# Patient Record
Sex: FEMALE | Race: WHITE | NOT HISPANIC OR LATINO | URBAN - METROPOLITAN AREA
[De-identification: names, ages, dates, MRNs, and addresses within clinical notes are randomized per-mention and may not be internally consistent; named-entity substitution may affect disease eponyms.]

---

## 2023-03-07 ENCOUNTER — APPOINTMENT (RX ONLY)
Dept: URBAN - METROPOLITAN AREA CLINIC 66 | Facility: CLINIC | Age: 28
Setting detail: DERMATOLOGY
End: 2023-03-07

## 2023-03-07 DIAGNOSIS — R21 RASH AND OTHER NONSPECIFIC SKIN ERUPTION: ICD-10-CM | Status: INADEQUATELY CONTROLLED

## 2023-03-07 PROCEDURE — ? MEDICATION COUNSELING

## 2023-03-07 PROCEDURE — ? COUNSELING

## 2023-03-07 PROCEDURE — ? PRESCRIPTION MEDICATION MANAGEMENT

## 2023-03-07 PROCEDURE — 99204 OFFICE O/P NEW MOD 45 MIN: CPT

## 2023-03-07 PROCEDURE — ? FULL BODY SKIN EXAM - DECLINED

## 2023-03-07 PROCEDURE — ? PRESCRIPTION

## 2023-03-07 RX ORDER — PIMECROLIMUS 10 MG/G
CREAM TOPICAL BID
Qty: 30 | Refills: 3 | Status: ERX | COMMUNITY
Start: 2023-03-07

## 2023-03-07 RX ORDER — MOMETASONE FUROATE 1 MG/G
CREAM TOPICAL BID
Qty: 45 | Refills: 1 | Status: ERX | COMMUNITY
Start: 2023-03-07

## 2023-03-07 RX ADMIN — MOMETASONE FUROATE: 1 CREAM TOPICAL at 00:00

## 2023-03-07 RX ADMIN — PIMECROLIMUS: 10 CREAM TOPICAL at 00:00

## 2023-03-07 ASSESSMENT — LOCATION DETAILED DESCRIPTION DERM
LOCATION DETAILED: LEFT SUPERIOR CENTRAL MALAR CHEEK
LOCATION DETAILED: RIGHT CENTRAL MALAR CHEEK
LOCATION DETAILED: LEFT CENTRAL BUCCAL CHEEK
LOCATION DETAILED: RIGHT CENTRAL BUCCAL CHEEK
LOCATION DETAILED: LEFT INFERIOR MEDIAL MALAR CHEEK
LOCATION DETAILED: RIGHT INFERIOR MEDIAL MALAR CHEEK

## 2023-03-07 ASSESSMENT — LOCATION ZONE DERM: LOCATION ZONE: FACE

## 2023-03-07 ASSESSMENT — LOCATION SIMPLE DESCRIPTION DERM
LOCATION SIMPLE: RIGHT CHEEK
LOCATION SIMPLE: LEFT CHEEK

## 2023-03-07 NOTE — PROCEDURE: PRESCRIPTION MEDICATION MANAGEMENT
Render In Strict Bullet Format?: No
Initiate Treatment: Mometasone 0.1% topical cream. Elidel 1% topical cream.
Detail Level: Zone

## 2023-03-07 NOTE — PROCEDURE: MEDICATION COUNSELING
Writer spoke with Anita with [facility], she wanted to update us as they recertified patient today. She will be seeing patient monthly for catheter changes and PRN for acute concerns.     FYI to PCP    Mirvaso Pregnancy And Lactation Text: This medication has not been assigned a Pregnancy Risk Category. It is unknown if the medication is excreted in breast milk.

## 2023-03-07 NOTE — PROCEDURE: MIPS QUALITY
Quality 431: Preventive Care And Screening: Unhealthy Alcohol Use - Screening: Patient not identified as an unhealthy alcohol user when screened for unhealthy alcohol use using a systematic screening method
Quality 226: Preventive Care And Screening: Tobacco Use: Screening And Cessation Intervention: Tobacco Screening not Performed
Detail Level: Detailed
Quality 130: Documentation Of Current Medications In The Medical Record: Current Medications Documented
Quality 402: Tobacco Use And Help With Quitting Among Adolescents: Patient screened for tobacco and never smoked

## 2023-03-28 ENCOUNTER — APPOINTMENT (RX ONLY)
Dept: URBAN - METROPOLITAN AREA CLINIC 66 | Facility: CLINIC | Age: 28
Setting detail: DERMATOLOGY
End: 2023-03-28

## 2023-03-28 DIAGNOSIS — R21 RASH AND OTHER NONSPECIFIC SKIN ERUPTION: ICD-10-CM | Status: INADEQUATELY CONTROLLED

## 2023-03-28 PROCEDURE — 99214 OFFICE O/P EST MOD 30 MIN: CPT

## 2023-03-28 PROCEDURE — ? ADDITIONAL NOTES

## 2023-03-28 PROCEDURE — ? MEDICATION COUNSELING

## 2023-03-28 PROCEDURE — ? PRESCRIPTION MEDICATION MANAGEMENT

## 2023-03-28 PROCEDURE — ? COUNSELING

## 2023-03-28 PROCEDURE — ? FULL BODY SKIN EXAM - DECLINED

## 2023-03-28 PROCEDURE — ? PRESCRIPTION

## 2023-03-28 RX ORDER — TACROLIMUS 1 MG/G
OINTMENT TOPICAL
Qty: 30 | Refills: 4 | Status: ERX | COMMUNITY
Start: 2023-03-28

## 2023-03-28 RX ADMIN — TACROLIMUS: 1 OINTMENT TOPICAL at 00:00

## 2023-03-28 ASSESSMENT — LOCATION DETAILED DESCRIPTION DERM
LOCATION DETAILED: LEFT INFERIOR CENTRAL MALAR CHEEK
LOCATION DETAILED: RIGHT SUPERIOR MEDIAL BUCCAL CHEEK
LOCATION DETAILED: RIGHT SUPERIOR CENTRAL MALAR CHEEK
LOCATION DETAILED: LEFT SUPERIOR MEDIAL MALAR CHEEK

## 2023-03-28 ASSESSMENT — LOCATION SIMPLE DESCRIPTION DERM
LOCATION SIMPLE: LEFT CHEEK
LOCATION SIMPLE: RIGHT CHEEK

## 2023-03-28 ASSESSMENT — LOCATION ZONE DERM: LOCATION ZONE: FACE

## 2023-03-28 NOTE — PROCEDURE: MIPS QUALITY
Detail Level: Detailed
Quality 130: Documentation Of Current Medications In The Medical Record: Current Medications Documented
Quality 402: Tobacco Use And Help With Quitting Among Adolescents: Patient screened for tobacco and never smoked
Quality 431: Preventive Care And Screening: Unhealthy Alcohol Use - Screening: Patient not identified as an unhealthy alcohol user when screened for unhealthy alcohol use using a systematic screening method
Quality 226: Preventive Care And Screening: Tobacco Use: Screening And Cessation Intervention: Tobacco Screening not Performed

## 2023-03-28 NOTE — PROCEDURE: PRESCRIPTION MEDICATION MANAGEMENT
Render In Strict Bullet Format?: No
Initiate Treatment: Tacrolimus 0.1% topical ointment
Detail Level: Zone
Continue Regimen: Mometasone 0.1% topical cream PRN when flaring
Discontinue Regimen: Elidel 1% topical cream

## 2023-03-28 NOTE — PROCEDURE: ADDITIONAL NOTES
Render Risk Assessment In Note?: no
Additional Notes: Patient was advised to take daily antihistamine
Detail Level: Simple

## 2023-03-28 NOTE — PROCEDURE: MEDICATION COUNSELING
minimum assist (75% patients effort)/contact guard Clofazimine Pregnancy And Lactation Text: This medication is Pregnancy Category C and isn't considered safe during pregnancy. It is excreted in breast milk.

## 2023-03-28 NOTE — PROCEDURE: MEDICATION COUNSELING
COVID-19 Screening:    • Does the patient OR patient’s household members have any of the following symptoms?  o Temperature: Fever ?100.0°F or ?37.8°C?  No  o Respiratory symptoms: New or worsening cough, shortness of breath, or sore throat? No  o GI symptoms: New onset of nausea, vomiting or diarrhea?  No  o Miscellaneous: New onset of loss of taste or smell, chills, repeated shaking with chills, muscle pain or headache?  No  • Has the patient or a household member tested positive for COVID-19 in the last 14 days?  No  • Has the patient or a household member been tested for COVID-19 and are waiting for the results?  No   Colchicine Counseling:  Patient counseled regarding adverse effects including but not limited to stomach upset (nausea, vomiting, stomach pain, or diarrhea).  Patient instructed to limit alcohol consumption while taking this medication.  Colchicine may reduce blood counts especially with prolonged use.  The patient understands that monitoring of kidney function and blood counts may be required, especially at baseline. The patient verbalized understanding of the proper use and possible adverse effects of colchicine.  All of the patient's questions and concerns were addressed.

## 2023-05-09 ENCOUNTER — APPOINTMENT (RX ONLY)
Dept: URBAN - METROPOLITAN AREA CLINIC 66 | Facility: CLINIC | Age: 28
Setting detail: DERMATOLOGY
End: 2023-05-09

## 2023-05-09 DIAGNOSIS — R21 RASH AND OTHER NONSPECIFIC SKIN ERUPTION: ICD-10-CM | Status: INADEQUATELY CONTROLLED

## 2023-05-09 PROCEDURE — ? ORDER TESTS

## 2023-05-09 PROCEDURE — ? PRESCRIPTION MEDICATION MANAGEMENT

## 2023-05-09 PROCEDURE — ? FULL BODY SKIN EXAM - DECLINED

## 2023-05-09 PROCEDURE — ? COUNSELING

## 2023-05-09 PROCEDURE — 99214 OFFICE O/P EST MOD 30 MIN: CPT

## 2023-05-09 PROCEDURE — ? PRESCRIPTION

## 2023-05-09 PROCEDURE — ? MEDICATION COUNSELING

## 2023-05-09 RX ORDER — DOXYCYCLINE HYCLATE 100 MG/1
TABLET, COATED ORAL BID
Qty: 60 | Refills: 0 | Status: ERX | COMMUNITY
Start: 2023-05-09

## 2023-05-09 RX ADMIN — DOXYCYCLINE HYCLATE: 100 TABLET, COATED ORAL at 00:00

## 2023-05-09 ASSESSMENT — LOCATION ZONE DERM: LOCATION ZONE: FACE

## 2023-05-09 ASSESSMENT — LOCATION SIMPLE DESCRIPTION DERM
LOCATION SIMPLE: LEFT CHEEK
LOCATION SIMPLE: RIGHT CHEEK
LOCATION SIMPLE: CHIN
LOCATION SIMPLE: INFERIOR FOREHEAD

## 2023-05-09 ASSESSMENT — LOCATION DETAILED DESCRIPTION DERM
LOCATION DETAILED: RIGHT CENTRAL MALAR CHEEK
LOCATION DETAILED: LEFT CHIN
LOCATION DETAILED: LEFT CENTRAL MALAR CHEEK
LOCATION DETAILED: INFERIOR MID FOREHEAD

## 2023-05-09 NOTE — PROCEDURE: MIPS QUALITY
Quality 130: Documentation Of Current Medications In The Medical Record: Current Medications Documented
Quality 431: Preventive Care And Screening: Unhealthy Alcohol Use - Screening: Patient not identified as an unhealthy alcohol user when screened for unhealthy alcohol use using a systematic screening method
Detail Level: Detailed
Quality 402: Tobacco Use And Help With Quitting Among Adolescents: Patient screened for tobacco and never smoked
Quality 226: Preventive Care And Screening: Tobacco Use: Screening And Cessation Intervention: Tobacco Screening not Performed

## 2023-05-09 NOTE — PROCEDURE: PRESCRIPTION MEDICATION MANAGEMENT
Initiate Treatment: Doxycycline 100 mg tablets BID X 1 month.
Render In Strict Bullet Format?: No
Continue Regimen: Elidel 1% topical cream. Mometasone 0.1% topical cream. Tacrolimus 0.1% topical ointment
Detail Level: Zone

## 2023-05-09 NOTE — PROCEDURE: ORDER TESTS
Performing Laboratory: [id number]
Expected Date Of Service: 05/09/2023
Bill For Surgical Tray: no
Billing Type: Third-Party Bill

## 2023-05-09 NOTE — PROCEDURE: MEDICATION COUNSELING
1/4/23 sent rcds to medicare    Hydroxychloroquine Counseling:  I discussed with the patient that a baseline ophthalmologic exam is needed at the start of therapy and every year thereafter while on therapy. A CBC may also be warranted for monitoring.  The side effects of this medication were discussed with the patient, including but not limited to agranulocytosis, aplastic anemia, seizures, rashes, retinopathy, and liver toxicity. Patient instructed to call the office should any adverse effect occur.  The patient verbalized understanding of the proper use and possible adverse effects of Plaquenil.  All the patient's questions and concerns were addressed.

## 2024-04-24 ENCOUNTER — APPOINTMENT (RX ONLY)
Dept: URBAN - METROPOLITAN AREA CLINIC 66 | Facility: CLINIC | Age: 29
Setting detail: DERMATOLOGY
End: 2024-04-24

## 2024-04-24 DIAGNOSIS — L71.0 PERIORAL DERMATITIS: ICD-10-CM

## 2024-04-24 PROCEDURE — ? COUNSELING

## 2024-04-24 PROCEDURE — 99213 OFFICE O/P EST LOW 20 MIN: CPT

## 2024-04-24 PROCEDURE — ? PRESCRIPTION MEDICATION MANAGEMENT

## 2024-04-24 PROCEDURE — ? PRESCRIPTION

## 2024-04-24 RX ORDER — ERYTHROMYCIN 20 MG/G
GEL TOPICAL
Qty: 60 | Refills: 2 | Status: ERX | COMMUNITY
Start: 2024-04-24

## 2024-04-24 RX ADMIN — ERYTHROMYCIN: 20 GEL TOPICAL at 00:00

## 2024-04-24 ASSESSMENT — LOCATION DETAILED DESCRIPTION DERM
LOCATION DETAILED: LEFT UPPER CUTANEOUS LIP
LOCATION DETAILED: RIGHT ORAL COMMISSURE
LOCATION DETAILED: LEFT INFERIOR MEDIAL MALAR CHEEK
LOCATION DETAILED: LEFT LOWER CUTANEOUS LIP
LOCATION DETAILED: RIGHT UPPER CUTANEOUS LIP
LOCATION DETAILED: LEFT MEDIAL BUCCAL CHEEK
LOCATION DETAILED: PHILTRUM
LOCATION DETAILED: LEFT NASOLABIAL FOLD

## 2024-04-24 ASSESSMENT — LOCATION ZONE DERM
LOCATION ZONE: FACE
LOCATION ZONE: LIP

## 2024-04-24 ASSESSMENT — LOCATION SIMPLE DESCRIPTION DERM
LOCATION SIMPLE: RIGHT LIP
LOCATION SIMPLE: UPPER LIP
LOCATION SIMPLE: LEFT CHEEK
LOCATION SIMPLE: LEFT LIP